# Patient Record
Sex: MALE | Race: WHITE | NOT HISPANIC OR LATINO | URBAN - METROPOLITAN AREA
[De-identification: names, ages, dates, MRNs, and addresses within clinical notes are randomized per-mention and may not be internally consistent; named-entity substitution may affect disease eponyms.]

---

## 2023-08-14 ENCOUNTER — HOSPITAL ENCOUNTER (OUTPATIENT)
Dept: DATA CONVERSION | Facility: HOSPITAL | Age: 68
Discharge: HOME | End: 2023-08-14

## 2023-08-14 DIAGNOSIS — R97.20 ELEVATED PROSTATE SPECIFIC ANTIGEN (PSA): ICD-10-CM

## 2023-09-05 ENCOUNTER — HOSPITAL ENCOUNTER (OUTPATIENT)
Dept: DATA CONVERSION | Facility: HOSPITAL | Age: 68
Discharge: HOME | End: 2023-09-05
Payer: COMMERCIAL

## 2023-09-05 DIAGNOSIS — R97.20 ELEVATED PROSTATE SPECIFIC ANTIGEN (PSA): ICD-10-CM

## 2023-09-05 LAB
ALBUMIN SERPL-MCNC: 4.3 GM/DL (ref 3.5–5)
ALBUMIN/GLOB SERPL: 1.7 RATIO (ref 1.5–3)
ALP BLD-CCNC: 79 U/L (ref 35–125)
ALT SERPL-CCNC: 19 U/L (ref 5–40)
ANION GAP SERPL CALCULATED.3IONS-SCNC: 11 MMOL/L (ref 0–19)
ANTICOAGULANT: NORMAL
APTT PPP: 27.6 SEC (ref 22–32.5)
AST SERPL-CCNC: 18 U/L (ref 5–40)
BASOPHILS # BLD AUTO: 0.04 K/UL (ref 0–0.22)
BASOPHILS NFR BLD AUTO: 0.7 % (ref 0–1)
BILIRUB SERPL-MCNC: 1.2 MG/DL (ref 0.1–1.2)
BILIRUB UR QL STRIP.AUTO: NEGATIVE
BUN SERPL-MCNC: 18 MG/DL (ref 8–25)
BUN/CREAT SERPL: 12.9 RATIO (ref 8–21)
CALCIUM SERPL-MCNC: 10.1 MG/DL (ref 8.5–10.4)
CHLORIDE SERPL-SCNC: 104 MMOL/L (ref 97–107)
CLARITY UR: CLEAR
CO2 SERPL-SCNC: 24 MMOL/L (ref 24–31)
COLOR UR: YELLOW
CREAT SERPL-MCNC: 1.4 MG/DL (ref 0.4–1.6)
DEPRECATED RDW RBC AUTO: 41 FL (ref 37–54)
DIFFERENTIAL METHOD BLD: ABNORMAL
EOSINOPHIL # BLD AUTO: 0.49 K/UL (ref 0–0.45)
EOSINOPHIL NFR BLD: 8.9 % (ref 0–3)
ERYTHROCYTE [DISTWIDTH] IN BLOOD BY AUTOMATED COUNT: 13.2 % (ref 11.7–15)
GFR SERPL CREATININE-BSD FRML MDRD: 54 ML/MIN/1.73 M2
GLOBULIN SER-MCNC: 2.5 G/DL (ref 1.9–3.7)
GLUCOSE SERPL-MCNC: 165 MG/DL (ref 65–99)
GLUCOSE UR STRIP.AUTO-MCNC: NEGATIVE MG/DL
HBA1C MFR BLD: 6.4 % (ref 4–6)
HCT VFR BLD AUTO: 41.9 % (ref 41–50)
HGB BLD-MCNC: 13.8 GM/DL (ref 13.5–16.5)
HGB UR QL STRIP.AUTO: ABNORMAL /HPF (ref 0–3)
HGB UR QL: ABNORMAL
IMM GRANULOCYTES # BLD AUTO: 0.14 K/UL (ref 0–0.1)
INR PPP: 1.1 (ref 0.86–1.16)
KETONES UR QL STRIP.AUTO: NEGATIVE
LEUKOCYTE ESTERASE UR QL STRIP.AUTO: NEGATIVE
LYMPHOCYTES # BLD AUTO: 0.71 K/UL (ref 1.2–3.2)
LYMPHOCYTES NFR BLD MANUAL: 13 % (ref 20–40)
MCH RBC QN AUTO: 27.5 PG (ref 26–34)
MCHC RBC AUTO-ENTMCNC: 32.9 % (ref 31–37)
MCV RBC AUTO: 83.5 FL (ref 80–100)
MICROSCOPIC (UA): ABNORMAL
MONOCYTES # BLD AUTO: 0.54 K/UL (ref 0–0.8)
MONOCYTES NFR BLD MANUAL: 9.9 % (ref 0–8)
NEUTROPHILS # BLD AUTO: 3.56 K/UL
NEUTROPHILS # BLD AUTO: 3.56 K/UL (ref 1.8–7.7)
NEUTROPHILS.IMMATURE NFR BLD: 2.6 % (ref 0–1)
NEUTS SEG NFR BLD: 64.9 % (ref 50–70)
NITRITE UR QL STRIP.AUTO: NEGATIVE
PH UR STRIP.AUTO: 6 [PH] (ref 4.6–8)
PLATELET # BLD AUTO: 176 K/UL (ref 150–450)
PMV BLD AUTO: 10.5 CU (ref 7–12.6)
POTASSIUM SERPL-SCNC: 4.5 MMOL/L (ref 3.4–5.1)
PROT SERPL-MCNC: 6.8 G/DL (ref 5.9–7.9)
PROT UR STRIP.AUTO-MCNC: 100 MG/DL
PROTHROMBIN TIME: 10.7 SEC (ref 9.3–12.7)
RBC # BLD AUTO: 5.02 M/UL (ref 4.5–5.5)
SODIUM SERPL-SCNC: 139 MMOL/L (ref 133–145)
SP GR UR STRIP.AUTO: 1.01 (ref 1–1.03)
URINE CULTURE: ABNORMAL
UROBILINOGEN UR QL STRIP.AUTO: 0.2 MG/DL (ref 0–1)
WBC # BLD AUTO: 5.5 K/UL (ref 4.5–11)
WBC #/AREA URNS AUTO: ABNORMAL /HPF (ref 0–3)

## 2023-09-08 ENCOUNTER — HOSPITAL ENCOUNTER (OUTPATIENT)
Dept: DATA CONVERSION | Facility: HOSPITAL | Age: 68
Discharge: HOME | End: 2023-09-08
Payer: COMMERCIAL

## 2023-09-08 DIAGNOSIS — E66.9 OBESITY, UNSPECIFIED: ICD-10-CM

## 2023-09-08 DIAGNOSIS — K21.9 GASTRO-ESOPHAGEAL REFLUX DISEASE WITHOUT ESOPHAGITIS: ICD-10-CM

## 2023-09-08 DIAGNOSIS — C61 MALIGNANT NEOPLASM OF PROSTATE (MULTI): ICD-10-CM

## 2023-09-08 DIAGNOSIS — I48.91 UNSPECIFIED ATRIAL FIBRILLATION (MULTI): ICD-10-CM

## 2023-09-08 DIAGNOSIS — R97.20 ELEVATED PROSTATE SPECIFIC ANTIGEN (PSA): ICD-10-CM

## 2023-09-08 DIAGNOSIS — I10 ESSENTIAL (PRIMARY) HYPERTENSION: ICD-10-CM

## 2023-09-08 DIAGNOSIS — E78.5 HYPERLIPIDEMIA, UNSPECIFIED: ICD-10-CM

## 2023-09-08 DIAGNOSIS — Z79.01 LONG TERM (CURRENT) USE OF ANTICOAGULANTS: ICD-10-CM

## 2023-09-08 DIAGNOSIS — N42.32 ATYPICAL SMALL ACINAR PROLIFERATION OF PROSTATE: ICD-10-CM

## 2024-08-07 DIAGNOSIS — R97.20 ELEVATED PSA: Primary | ICD-10-CM

## 2024-08-08 ENCOUNTER — APPOINTMENT (OUTPATIENT)
Dept: UROLOGY | Facility: CLINIC | Age: 69
End: 2024-08-08
Payer: COMMERCIAL

## 2024-08-22 ENCOUNTER — APPOINTMENT (OUTPATIENT)
Dept: UROLOGY | Facility: CLINIC | Age: 69
End: 2024-08-22
Payer: COMMERCIAL

## 2024-08-22 VITALS — BODY MASS INDEX: 30.46 KG/M2 | TEMPERATURE: 96.2 F | HEIGHT: 75 IN | WEIGHT: 245 LBS

## 2024-08-22 DIAGNOSIS — R31.29 MICROSCOPIC HEMATURIA: ICD-10-CM

## 2024-08-22 DIAGNOSIS — N40.0 BENIGN PROSTATIC HYPERPLASIA, UNSPECIFIED WHETHER LOWER URINARY TRACT SYMPTOMS PRESENT: Primary | ICD-10-CM

## 2024-08-22 DIAGNOSIS — R97.20 ELEVATED PSA: ICD-10-CM

## 2024-08-22 DIAGNOSIS — C61 PROSTATE CANCER (MULTI): ICD-10-CM

## 2024-08-22 DIAGNOSIS — R82.90 ABNORMAL FINDING ON URINALYSIS: ICD-10-CM

## 2024-08-22 LAB
POC APPEARANCE, URINE: CLEAR
POC BILIRUBIN, URINE: NEGATIVE
POC BLOOD, URINE: ABNORMAL
POC COLOR, URINE: YELLOW
POC GLUCOSE, URINE: NEGATIVE MG/DL
POC KETONES, URINE: NEGATIVE MG/DL
POC LEUKOCYTES, URINE: NEGATIVE
POC NITRITE,URINE: NEGATIVE
POC PH, URINE: 5.5 PH
POC PROTEIN, URINE: ABNORMAL MG/DL
POC SPECIFIC GRAVITY, URINE: >=1.03
POC UROBILINOGEN, URINE: 0.2 EU/DL

## 2024-08-22 PROCEDURE — 81001 URINALYSIS AUTO W/SCOPE: CPT

## 2024-08-22 PROCEDURE — 3008F BODY MASS INDEX DOCD: CPT | Performed by: UROLOGY

## 2024-08-22 PROCEDURE — 1126F AMNT PAIN NOTED NONE PRSNT: CPT | Performed by: UROLOGY

## 2024-08-22 PROCEDURE — 81002 URINALYSIS NONAUTO W/O SCOPE: CPT | Performed by: UROLOGY

## 2024-08-22 PROCEDURE — 1159F MED LIST DOCD IN RCRD: CPT | Performed by: UROLOGY

## 2024-08-22 PROCEDURE — 99214 OFFICE O/P EST MOD 30 MIN: CPT | Performed by: UROLOGY

## 2024-08-22 PROCEDURE — G2211 COMPLEX E/M VISIT ADD ON: HCPCS | Performed by: UROLOGY

## 2024-08-22 PROCEDURE — 87086 URINE CULTURE/COLONY COUNT: CPT

## 2024-08-22 RX ORDER — ATORVASTATIN CALCIUM 10 MG/1
TABLET, FILM COATED ORAL
COMMUNITY
Start: 2024-05-13

## 2024-08-22 RX ORDER — METOPROLOL TARTRATE 25 MG/1
25 TABLET, FILM COATED ORAL 2 TIMES DAILY
COMMUNITY
Start: 2023-09-11

## 2024-08-22 RX ORDER — LISINOPRIL 20 MG/1
TABLET ORAL
COMMUNITY
Start: 2024-05-13

## 2024-08-22 RX ORDER — MONTELUKAST SODIUM 10 MG/1
1 TABLET ORAL NIGHTLY
COMMUNITY
Start: 2024-05-13

## 2024-08-22 RX ORDER — EFINACONAZOLE 100 MG/ML
1 SOLUTION TOPICAL
COMMUNITY
Start: 2024-03-06

## 2024-08-22 RX ORDER — MULTIVITAMIN
1 TABLET ORAL
COMMUNITY

## 2024-08-22 RX ORDER — CALCIUM CARBONATE 600 MG
TABLET ORAL
COMMUNITY

## 2024-08-22 RX ORDER — RIVAROXABAN 20 MG/1
TABLET, FILM COATED ORAL
COMMUNITY
Start: 2023-09-25

## 2024-08-22 ASSESSMENT — PAIN SCALES - GENERAL: PAINLEVEL: 0-NO PAIN

## 2024-08-22 NOTE — PATIENT INSTRUCTIONS
Please call Radiology scheduling at [phone] To schedule MRI for [facility]

## 2024-08-22 NOTE — Clinical Note
August 21, 2024       No Recipients    Patient: Chris Iraheta   YOB: 1955   Date of Visit: 8/22/2024       Dear Dr. Rene Recipients:    Thank you for referring Chris Iraheta to me for evaluation. Below are my notes for this consultation.  If you have questions, please do not hesitate to call me. I look forward to following your patient along with you.       Sincerely,     Mathieu Escalante MD      CC:   No Recipients  ______________________________________________________________________________________

## 2024-08-22 NOTE — PROGRESS NOTES
"Dana  Patient is a 68 y.o. male presenting with a history of prostate cancer.    SUBJECTIVE:  HPI   His PSA in July 2024 was 6.08.  PSA was 5.87 in January 2024, and 6.81 in July 2023.    No results found for: \"URINECULTURE\"     No past medical history on file.   No past surgical history on file.   No family history on file.   Social History     Socioeconomic History    Marital status:    Tobacco Use    Smoking status: Never    Smokeless tobacco: Never     Social Determinants of Health     Physical Activity: Sufficiently Active (1/9/2024)    Received from [facility]    Exercise Vital Sign     Days of Exercise per Week: 3 days     Minutes of Exercise per Session: 90 min        Review of Systems   Constitutional: denies any unintentional weight loss or change in strength.  Integumentary: denies any rashes or pruritus.  Eyes: denies any double vision or eye pain.  Ear/Nose/Mouth/Throat: denies any nosebleeds or gum bleeds.  Cardiovascular: denies any chest pain or syncope.  Respiratory: denies hemoptysis.  Gastrointestinal: denies nausea or vomiting.  Musculoskeletal: denies muscle cramping or weakness.  Neurologic: denies convulsions or seizures.  Hematologic/Lymphatic: denies bleeding tendencies.  Endocrine: denies heat/cold intolerance.  All other systems have been reviewed and are negative unless otherwise noted in the HPI.    OBJECTIVE:  Visit Vitals  Temp 35.7 °C (96.2 °F)     Physical Exam   Constitutional: No obvious distress.  Eyes: Non-injected conjunctiva, sclera clear, EOMI.  Ears/Nose/Mouth/Throat: No obvious drainage per ears or nose.  Cardiovascular: Extremities are warm and well perfused. No edema, cyanosis or pallor.  Respiratory: No audible wheezing/stridor; respirations do not appear labored.  Gastrointestinal: Abdomen soft, not distended.  Musculoskeletal: Normal ROM of extremities.  Skin: No obvious rashes or open sores.  Neurologic: Alert and oriented, CN 2-12 grossly " "intact.  Psychiatric: Answers questions appropriately with normal affect.  Hematologic/Lymphatic/Immunologic: No obvious bruises or sites of spontaneous bleeding.  Genitourinary: No CVA tenderness, bladder not palpable. 3+    Labs:  Lab Results   Component Value Date    WBC 5.5 09/05/2023    HGB 13.8 09/05/2023    HCT 41.9 09/05/2023     09/05/2023    ALT 19 09/05/2023    AST 18 09/05/2023     09/05/2023    K 4.5 09/05/2023     09/05/2023    CREATININE 1.4 09/05/2023    BUN 18 09/05/2023    CO2 24 09/05/2023    INR 1.1 09/05/2023    HGBA1C 6.5 (H) 07/09/2024     No results found for: \"KPSAT\", \"KPSAP\"  IMAGING:        PROCEDURES:    Post void residual was 3 mL.    ASSESSMENT/PLAN:  Problem List Items Addressed This Visit    None  Visit Diagnoses       Benign prostatic hyperplasia, unspecified whether lower urinary tract symptoms present    -  Primary    Relevant Orders    Measure post void residual (Completed)    POCT UA (nonautomated) manually resulted (Completed)           He has a history of a [place] 6 prostate cancer in 3 cores in March 2021.  Prostate biopsy in September 2023 was a Doug 7.  PSMA PET scan was negative except for pulmonary nodule.  He has followed up with his PCP.  MRI identified a PI-RADS 4 and 5 lesion with possible extraprostatic extension in 8/2023  Decipher score was low risk.  His PSA will be followed.     He has history of a left spermatocele treated with spermatocele ectomy.    He will schedule a prostate MRI and prostate biopsy is recommended thereafter.    Urinalysis identified microscopic hematuria, He has been evaluated previously with CT and cystoscopy.  His urine was sent for microscopic analysis, culture, and sensitivity.    All questions were answered to the patient’s satisfaction.  Patient agrees with the plan and wishes to proceed.  Follow-up will be scheduled appropriately.     Mathieu Escalante MD    "

## 2024-08-23 LAB
BACTERIA UR CULT: NO GROWTH
MUCOUS THREADS #/AREA URNS AUTO: NORMAL /LPF
RBC #/AREA URNS AUTO: NORMAL /HPF
WBC #/AREA URNS AUTO: NORMAL /HPF

## 2024-08-24 PROBLEM — R31.29 MICROSCOPIC HEMATURIA: Status: ACTIVE | Noted: 2024-08-24

## 2024-08-24 PROBLEM — C61 PROSTATE CANCER (MULTI): Status: ACTIVE | Noted: 2024-08-24

## 2025-02-27 ENCOUNTER — APPOINTMENT (OUTPATIENT)
Dept: UROLOGY | Facility: CLINIC | Age: 70
End: 2025-02-27
Payer: COMMERCIAL

## 2025-07-30 NOTE — PROGRESS NOTES
Patient is a 69 y.o. male presenting to followup with a PMH of prostate cancer.    SUBJECTIVE:  HPI   He presents to followup history of prostate cancer. His last PSA was 6.0 in July 2025. He states he had a difficult time following his prostate biopsy in the past. He had bleeding for weeks following the biopsy.    He has history of spermatocelectomy and states he has healed well. He does not have any other urinary concerns today.    Review of Systems   Pertinent findings noted in the HPI.     OBJECTIVE:  There were no vitals taken for this visit.    Physical Exam   Constitutional: No obvious distress.  Cardiovascular: Extremities are warm and well perfused.  Respiratory: No audible wheezing/stridor; respirations do not appear labored.  Neurologic: Alert and oriented x3.  Genitourinary: No CVA tenderness, bladder not palpable.      Labs:  Lab Results   Component Value Date    WBC 5.5 09/05/2023    HGB 13.8 09/05/2023    HCT 41.9 09/05/2023    MCV 83.5 09/05/2023     09/05/2023    GLUCOSE 165 (H) 09/05/2023    CALCIUM 10.1 09/05/2023     09/05/2023    K 4.5 09/05/2023    CO2 24 09/05/2023     09/05/2023    BUN 18 09/05/2023    CREATININE 1.4 09/05/2023    EGFR 54 09/05/2023    URINECULTURE No growth 08/22/2024   SURGICAL PATHOLOGY:  Prostate biopsy: 9/8/2023  CONVERTED FINAL DIAGNOSIS   1.  PROSTATE, AREA OF INTEREST #1 - LEFT PROSTATE, NEEDLE BIOPSY:       ADENOCARCINOMA, KENNETH SCORE 3+3 = 6, GRADE GROUP 1, INVOLVING 2  OF 5            CORES, SEE COMMENT.    COMMENT:  The immunohistochemical stains for AMACR and cytokeratin 903  support the above diagnosis.  The carcinoma involves less than 5% of the  biopsy tissue.    2.  PROSTATE, AREA OF INTEREST #2 - RIGHT PROSTATE, NEEDLE BIOPSY:       ADENOCARCINOMA, KENNETH SCORE 4+3 = 7, GRADE GROUP 3, INVOLVING 1  OF 6            CORES, SEE COMMENT.    COMMENT:  The carcinoma involves less than 5% of the biopsy tissue.    3.  PROSTATE, LEFT BASE  LATERAL, NEEDLE BIOPSY:       ATYPICAL SMALL ACINAR PROLIFERATION.    4.  PROSTATE, LEFT BASE MEDIAL, NEEDLE BIOPSY:       BENIGN PROSTATIC TISSUE.    5.  PROSTATE, LEFT APEX LATERAL, NEEDLE BIOPSY:       ADENOCARCINOMA, KENNETH SCORE 3+ 3 = 6, GRADE GROUP 1, INVOLVING 1  OF 1             CORE, SEE COMMENT.       PERINEURAL INVASION IDENTIFIED.    Comment: The immunohistochemical stain findings for cytokeratin 903 and  AMACR support the above diagnosis. The carcinoma involves approximately  5% of the biopsy tissue.    6.  PROSTATE, LEFT APEX MEDIAL, NEEDLE BIOPSY:       BENIGN PROSTATIC TISSUE.    7.  PROSTATE, LEFT MID LATERAL, NEEDLE BIOPSY:       ATYPICAL SMALL ACINAR PROLIFERATION.    8.  PROSTATE, LEFT MID MEDIAL, NEEDLE BIOPSY:       BENIGN PROSTATIC TISSUE.        9.  PROSTATE, RIGHT BASE MEDIAL, NEEDLE BIOPSY:       ADENOCARCINOMA, KENNETH SCORE 4+3 = 7, GRADE GROUP 3, INVOLVING 1  OF 2            CORES, SEE COMMENT.    COMMENT:  The carcinoma involves 25% of the biopsy tissue.    10.  PROSTATE, RIGHT BASE LATERAL, NEEDLE BIOPSY:       ATYPICAL SMALL ACINAR PROLIFERATION.    11.  PROSTATE, RIGHT APEX LATERAL, NEEDLE BIOPSY:       BENIGN PROSTATIC TISSUE.    12.  PROSTATE, RIGHT APEX MEDIAL, NEEDLE BIOPSY:       BENIGN PROSTATIC TISSUE.    13.  PROSTATE, RIGHT MID LATERAL, NEEDLE BIOPSY:       BENIGN PROSTATIC TISSUE.    14.  PROSTATE, RIGHT MID MEDIAL, NEEDLE BIOPSY:       BENIGN PROSTATIC TISSUE.     IMAGING:  === 08/14/23 ===  MR PROSTATE ANNE-MARIE BOUNDARIES  - Impression -  1. Large, 2 cm area of lenticular pathologic signal intensity within the  anterolateral transition zone on the left, mid prostate, PI-RADS category 5.  There is questionable extraprostatic extension adjacent to this lesion.  2. Punctate focus of restricted diffusion within the medial aspect of the  right peripheral zone posteriorly mid prostate, PI-RADS category 4.  3. Bilateral inguinal hernias containing mesenteric fat with  fluid  associated with a right inguinal hernia, of uncertain significance.  4. Minimal sigmoid diverticulosis.  5. DynaCAD workup complete.    NM PET CT tumors not previously listed: 11/21/2023  IMPRESSION:   HEAD/NECK:   * No PSMA-expressing lesions suspicious for metastases.   CHEST:   * No PSMA-expressing lesions suspicious for metastases.   *  Subcentimeter hilar lymph nodes with low level PSMA expression, likely   reactive.   * Few small (<6 mm) pulmonary nodules (possible fissural lymph nodes)   may be below PET.  Attention on follow-up.   ABDOMENS/PELVIS:   * Heterogeneous low level PSMA expression in the prostate, some which   may correspond with known neoplasm.   *  No PSMA-expressing lesions suspicious for metastases.   BONES/EXTREMITIES:   * No PSMA-expressing lesions suspicious for metastases.     PROCEDURES:  PVR: 38 mL  (7/31/2025)    ASSESSMENT/PLAN:  Problem List Items Addressed This Visit       Prostate cancer (Multi) - Primary    Relevant Orders    MR prostate with casie boundaries if pirads 3 or above    Microscopic hematuria     Other Visit Diagnoses         Urinary symptom or sign        Relevant Orders    Measure post void residual (Completed)    POCT UA Automated manually resulted (Completed)          He has a history of a Doug 6 prostate cancer in 3 cores in March 2021.  Prostate biopsy in September 2023 identified a [place] 7.  PSMA PET scan was negative except for pulmonary nodule.  He has followed up with his PCP. MRI identified a PI-RADS 4 and 5 lesion with possible extraprostatic extension in 8/2023. Decipher score was low risk. His PSA is stable.     We discussed his prostate cancer, and the role of repeating a prostate MRI and biopsy. He did have a difficult time following his last biopsy. He will schedule prostate MRI, and I recommend repeating the prostate biopsy thereafter.    PSA  summary  07/23/2025 6.0  01/07/2025 5.56  08/21/2024 6.76  07/09/2024 6.08  01/30/2024 5.87  1/15/2023 5.51  07/11/2023 6.81  01/11/2023 5.79    He has history of a left spermatocele treated with spermatocelectomy.    Urinalysis identified microscopic hematuria, He has been evaluated previously with CT and cystoscopy.   POCT urinalysis identified small blood today and urine will be sent for microscopy, culture, and sensitivity.     All questions were answered to the patient’s satisfaction.  Patient agrees with the plan and wishes to proceed.  Follow-up will be scheduled appropriately.     Elena LONGORIA, am scribing for, and in the presence of Mathieu Escalante MD.     Mathieu LONGORIA MD, personally performed the services described in the documentation as scribed by Elena Rosario, in my presence, and confirm it is both accurate and complete.

## 2025-07-31 ENCOUNTER — APPOINTMENT (OUTPATIENT)
Dept: UROLOGY | Facility: CLINIC | Age: 70
End: 2025-07-31
Payer: COMMERCIAL

## 2025-07-31 DIAGNOSIS — R31.29 MICROSCOPIC HEMATURIA: ICD-10-CM

## 2025-07-31 DIAGNOSIS — C61 PROSTATE CANCER (MULTI): Primary | ICD-10-CM

## 2025-07-31 DIAGNOSIS — R39.9 URINARY SYMPTOM OR SIGN: ICD-10-CM

## 2025-07-31 LAB
POC APPEARANCE, URINE: CLEAR
POC BILIRUBIN, URINE: NEGATIVE
POC BLOOD, URINE: ABNORMAL
POC COLOR, URINE: YELLOW
POC GLUCOSE, URINE: ABNORMAL MG/DL
POC KETONES, URINE: NEGATIVE MG/DL
POC LEUKOCYTES, URINE: NEGATIVE
POC NITRITE,URINE: NEGATIVE
POC PH, URINE: 5.5 PH
POC PROTEIN, URINE: ABNORMAL MG/DL
POC SPECIFIC GRAVITY, URINE: 1.02
POC UROBILINOGEN, URINE: 0.2 EU/DL

## 2025-07-31 RX ORDER — EMPAGLIFLOZIN 25 MG/1
25 TABLET, FILM COATED ORAL
COMMUNITY

## 2025-07-31 RX ORDER — GLIMEPIRIDE 2 MG/1
2 TABLET ORAL
COMMUNITY
Start: 2025-07-14

## 2025-07-31 ASSESSMENT — PAIN SCALES - GENERAL: PAINLEVEL_OUTOF10: 0-NO PAIN

## 2025-07-31 NOTE — PATIENT INSTRUCTIONS
Please call Radiology scheduling at [phone] MRI prostate at [facility]

## 2025-08-02 LAB
BACTERIA #/AREA URNS HPF: NORMAL /HPF
BACTERIA UR CULT: NORMAL
HYALINE CASTS #/AREA URNS LPF: NORMAL /LPF
RBC #/AREA URNS HPF: NORMAL /HPF
SERVICE CMNT-IMP: NORMAL
SQUAMOUS #/AREA URNS HPF: NORMAL /HPF
WBC #/AREA URNS HPF: NORMAL /HPF

## 2025-08-22 ENCOUNTER — HOSPITAL ENCOUNTER (OUTPATIENT)
Dept: RADIOLOGY | Facility: HOSPITAL | Age: 70
Discharge: HOME | End: 2025-08-22
Payer: MEDICARE

## 2025-08-22 DIAGNOSIS — C61 PROSTATE CANCER (MULTI): ICD-10-CM

## 2025-08-22 PROCEDURE — A9575 INJ GADOTERATE MEGLUMI 0.1ML: HCPCS | Performed by: UROLOGY

## 2025-08-22 PROCEDURE — 72197 MRI PELVIS W/O & W/DYE: CPT

## 2025-08-22 PROCEDURE — 2550000001 HC RX 255 CONTRASTS: Performed by: UROLOGY

## 2025-08-22 RX ORDER — GADOTERATE MEGLUMINE 376.9 MG/ML
24 INJECTION INTRAVENOUS
Status: COMPLETED | OUTPATIENT
Start: 2025-08-22 | End: 2025-08-22

## 2025-08-22 RX ADMIN — GADOTERATE MEGLUMINE 24 ML: 376.9 INJECTION INTRAVENOUS at 17:32

## 2025-08-27 ENCOUNTER — RESULTS FOLLOW-UP (OUTPATIENT)
Dept: UROLOGY | Facility: HOSPITAL | Age: 70
End: 2025-08-27
Payer: MEDICARE

## 2025-08-27 DIAGNOSIS — C61 PROSTATE CANCER (MULTI): Primary | ICD-10-CM

## 2026-01-29 ENCOUNTER — APPOINTMENT (OUTPATIENT)
Dept: UROLOGY | Facility: CLINIC | Age: 71
End: 2026-01-29
Payer: COMMERCIAL